# Patient Record
Sex: MALE | Race: WHITE | NOT HISPANIC OR LATINO
[De-identification: names, ages, dates, MRNs, and addresses within clinical notes are randomized per-mention and may not be internally consistent; named-entity substitution may affect disease eponyms.]

---

## 2020-11-20 ENCOUNTER — TRANSCRIPTION ENCOUNTER (OUTPATIENT)
Age: 40
End: 2020-11-20

## 2021-06-01 PROBLEM — Z00.00 ENCOUNTER FOR PREVENTIVE HEALTH EXAMINATION: Status: ACTIVE | Noted: 2021-06-01

## 2021-06-03 ENCOUNTER — APPOINTMENT (OUTPATIENT)
Dept: HEMATOLOGY ONCOLOGY | Facility: CLINIC | Age: 41
End: 2021-06-03

## 2021-07-09 ENCOUNTER — APPOINTMENT (OUTPATIENT)
Dept: HEMATOLOGY ONCOLOGY | Facility: CLINIC | Age: 41
End: 2021-07-09

## 2021-08-30 ENCOUNTER — TRANSCRIPTION ENCOUNTER (OUTPATIENT)
Age: 41
End: 2021-08-30

## 2022-06-01 ENCOUNTER — APPOINTMENT (OUTPATIENT)
Age: 42
End: 2022-06-01

## 2023-07-05 ENCOUNTER — APPOINTMENT (OUTPATIENT)
Dept: ORTHOPEDIC SURGERY | Facility: CLINIC | Age: 43
End: 2023-07-05
Payer: COMMERCIAL

## 2023-07-05 VITALS — HEIGHT: 66 IN | BODY MASS INDEX: 24.91 KG/M2 | WEIGHT: 155 LBS

## 2023-07-05 DIAGNOSIS — M25.512 PAIN IN LEFT SHOULDER: ICD-10-CM

## 2023-07-05 PROCEDURE — 20610 DRAIN/INJ JOINT/BURSA W/O US: CPT | Mod: LT

## 2023-07-05 PROCEDURE — 99203 OFFICE O/P NEW LOW 30 MIN: CPT | Mod: 25

## 2023-07-05 RX ORDER — TRAMADOL HYDROCHLORIDE 50 MG/1
50 TABLET, COATED ORAL
Qty: 14 | Refills: 0 | Status: ACTIVE | COMMUNITY
Start: 2023-07-05 | End: 1900-01-01

## 2023-07-05 NOTE — DISCUSSION/SUMMARY
[de-identified] : Impression: Left shoulder pain possible due to calcific tendinitis.\par \par Plan: Offered cortisone injection\par After verbal consent, a cortisone injection to left shoulder was given.\par In a sterile technique using alcohol as antiseptic and ethyl chloride as an anesthetic a cortisone injection was given to the left shoulder using a sterile technique and I used the posterior approach, cortisone injection was given to subacromial space and glenohumeral joint.\par Patient tolerated well.  Band-Aid was placed over the puncture site.\par Patient was advised that these injections can only be given 3 times a year.\par \par Diclofenac 75 mg sent to pharmacy.\par Tramadol for break through pain\par \par Advised against sling.\par Pendulum exercises, activities as tolerated\par \par Follow-up: 3- 4 weeks\par \par

## 2023-07-05 NOTE — HISTORY OF PRESENT ILLNESS
[de-identified] : 43-year-old male here for an evaluation of pain to the left shoulder, patient states that this pain started on about July 1, 2023, patient states that he is quite active he knows he had a previous injury to the shoulder and he has been doing significant activities he was doing power wash and he practices martial arts and his shoulder has been hurting.\par Incident he was seen at an urgent center where x-rays were done and he was advised to follow-up with orthopedics.\par \par Patient denies any medical problems.  Patient denies taking any medication, patient admits to allergy to penicillin

## 2023-07-05 NOTE — IMAGING
[de-identified] : On examination of the left shoulder, patient has significant pain with any slight movement of the shoulder.\par Motor strength decreased possibly due to pain.\par On examination there is no swelling or ecchymosis noted.\par Neurovascular intact\par \par X-ray of the left shoulder was read by the patient and CD, this x-ray was saved in our system.\par This x-ray is negative for any acute fracture or dislocation, there is a large calcification seen in the subacromial space

## 2023-07-21 ENCOUNTER — APPOINTMENT (OUTPATIENT)
Dept: MRI IMAGING | Facility: CLINIC | Age: 43
End: 2023-07-21

## 2023-07-28 ENCOUNTER — APPOINTMENT (OUTPATIENT)
Dept: ORTHOPEDIC SURGERY | Facility: CLINIC | Age: 43
End: 2023-07-28

## 2023-08-03 ENCOUNTER — RX RENEWAL (OUTPATIENT)
Age: 43
End: 2023-08-03

## 2023-08-03 RX ORDER — DICLOFENAC SODIUM 75 MG/1
75 TABLET, DELAYED RELEASE ORAL
Qty: 60 | Refills: 0 | Status: ACTIVE | COMMUNITY
Start: 2023-07-05 | End: 1900-01-01

## 2024-12-01 ENCOUNTER — NON-APPOINTMENT (OUTPATIENT)
Age: 44
End: 2024-12-01